# Patient Record
Sex: FEMALE | Race: WHITE | NOT HISPANIC OR LATINO | Employment: FULL TIME | URBAN - METROPOLITAN AREA
[De-identification: names, ages, dates, MRNs, and addresses within clinical notes are randomized per-mention and may not be internally consistent; named-entity substitution may affect disease eponyms.]

---

## 2022-05-23 ENCOUNTER — OFFICE VISIT (OUTPATIENT)
Dept: INTERNAL MEDICINE | Facility: CLINIC | Age: 27
End: 2022-05-23

## 2022-05-23 VITALS
TEMPERATURE: 96.9 F | OXYGEN SATURATION: 99 % | HEIGHT: 64 IN | SYSTOLIC BLOOD PRESSURE: 122 MMHG | DIASTOLIC BLOOD PRESSURE: 80 MMHG | WEIGHT: 124.4 LBS | BODY MASS INDEX: 21.24 KG/M2 | HEART RATE: 77 BPM

## 2022-05-23 DIAGNOSIS — Z13.220 SCREENING, LIPID: ICD-10-CM

## 2022-05-23 DIAGNOSIS — Z13.228 SCREENING FOR METABOLIC DISORDER: ICD-10-CM

## 2022-05-23 DIAGNOSIS — Z00.00 ANNUAL PHYSICAL EXAM: Primary | ICD-10-CM

## 2022-05-23 DIAGNOSIS — Z83.49 FAMILY HISTORY OF THYROID DISEASE: ICD-10-CM

## 2022-05-23 DIAGNOSIS — Z13.9 SCREENING FOR UNSPECIFIED CONDITION: ICD-10-CM

## 2022-05-23 DIAGNOSIS — Z13.0 SCREENING, ANEMIA, DEFICIENCY, IRON: ICD-10-CM

## 2022-05-23 PROCEDURE — 99385 PREV VISIT NEW AGE 18-39: CPT | Performed by: FAMILY MEDICINE

## 2022-05-23 RX ORDER — NORETHINDRONE ACETATE AND ETHINYL ESTRADIOL 1MG-20(21)
KIT ORAL
COMMUNITY

## 2022-05-24 LAB
ALBUMIN SERPL-MCNC: 4.6 G/DL (ref 3.9–5)
ALBUMIN/GLOB SERPL: 2.4 {RATIO} (ref 1.2–2.2)
ALP SERPL-CCNC: 58 IU/L (ref 44–121)
ALT SERPL-CCNC: 23 IU/L (ref 0–32)
AST SERPL-CCNC: 26 IU/L (ref 0–40)
BASOPHILS # BLD AUTO: 0.1 X10E3/UL (ref 0–0.2)
BASOPHILS NFR BLD AUTO: 1 %
BILIRUB SERPL-MCNC: 0.3 MG/DL (ref 0–1.2)
BUN SERPL-MCNC: 17 MG/DL (ref 6–20)
BUN/CREAT SERPL: 20 (ref 9–23)
CALCIUM SERPL-MCNC: 9.1 MG/DL (ref 8.7–10.2)
CHLORIDE SERPL-SCNC: 104 MMOL/L (ref 96–106)
CHOLEST SERPL-MCNC: 162 MG/DL (ref 100–199)
CO2 SERPL-SCNC: 21 MMOL/L (ref 20–29)
CREAT SERPL-MCNC: 0.86 MG/DL (ref 0.57–1)
EGFRCR SERPLBLD CKD-EPI 2021: 95 ML/MIN/1.73
EOSINOPHIL # BLD AUTO: 0.1 X10E3/UL (ref 0–0.4)
EOSINOPHIL NFR BLD AUTO: 1 %
ERYTHROCYTE [DISTWIDTH] IN BLOOD BY AUTOMATED COUNT: 11.7 % (ref 11.7–15.4)
FT4I SERPL CALC-MCNC: 1.9 (ref 1.2–4.9)
GLOBULIN SER CALC-MCNC: 1.9 G/DL (ref 1.5–4.5)
GLUCOSE SERPL-MCNC: 97 MG/DL (ref 65–99)
HCT VFR BLD AUTO: 41.8 % (ref 34–46.6)
HDLC SERPL-MCNC: 69 MG/DL
HGB BLD-MCNC: 14.1 G/DL (ref 11.1–15.9)
IMM GRANULOCYTES # BLD AUTO: 0 X10E3/UL (ref 0–0.1)
IMM GRANULOCYTES NFR BLD AUTO: 0 %
LDLC SERPL CALC-MCNC: 79 MG/DL (ref 0–99)
LYMPHOCYTES # BLD AUTO: 3.3 X10E3/UL (ref 0.7–3.1)
LYMPHOCYTES NFR BLD AUTO: 39 %
MCH RBC QN AUTO: 30.3 PG (ref 26.6–33)
MCHC RBC AUTO-ENTMCNC: 33.7 G/DL (ref 31.5–35.7)
MCV RBC AUTO: 90 FL (ref 79–97)
MONOCYTES # BLD AUTO: 0.5 X10E3/UL (ref 0.1–0.9)
MONOCYTES NFR BLD AUTO: 5 %
NEUTROPHILS # BLD AUTO: 4.5 X10E3/UL (ref 1.4–7)
NEUTROPHILS NFR BLD AUTO: 54 %
PLATELET # BLD AUTO: 258 X10E3/UL (ref 150–450)
POTASSIUM SERPL-SCNC: 4.4 MMOL/L (ref 3.5–5.2)
PROT SERPL-MCNC: 6.5 G/DL (ref 6–8.5)
RBC # BLD AUTO: 4.66 X10E6/UL (ref 3.77–5.28)
SODIUM SERPL-SCNC: 142 MMOL/L (ref 134–144)
T3RU NFR SERPL: 22 % (ref 24–39)
T4 SERPL-MCNC: 8.6 UG/DL (ref 4.5–12)
TRIGL SERPL-MCNC: 73 MG/DL (ref 0–149)
TSH SERPL DL<=0.005 MIU/L-ACNC: 2.41 UIU/ML (ref 0.45–4.5)
VLDLC SERPL CALC-MCNC: 14 MG/DL (ref 5–40)
WBC # BLD AUTO: 8.5 X10E3/UL (ref 3.4–10.8)

## 2022-05-25 ENCOUNTER — PATIENT ROUNDING (BHMG ONLY) (OUTPATIENT)
Dept: INTERNAL MEDICINE | Facility: CLINIC | Age: 27
End: 2022-05-25

## 2022-07-08 ENCOUNTER — OFFICE VISIT (OUTPATIENT)
Dept: INTERNAL MEDICINE | Facility: CLINIC | Age: 27
End: 2022-07-08

## 2022-07-08 ENCOUNTER — HOSPITAL ENCOUNTER (OUTPATIENT)
Dept: GENERAL RADIOLOGY | Facility: HOSPITAL | Age: 27
Discharge: HOME | End: 2022-07-08
Admitting: FAMILY MEDICINE

## 2022-07-08 VITALS
SYSTOLIC BLOOD PRESSURE: 110 MMHG | DIASTOLIC BLOOD PRESSURE: 60 MMHG | WEIGHT: 126 LBS | OXYGEN SATURATION: 99 % | HEART RATE: 71 BPM | HEIGHT: 64 IN | BODY MASS INDEX: 21.51 KG/M2 | TEMPERATURE: 96.8 F

## 2022-07-08 DIAGNOSIS — G89.29 CHRONIC BILATERAL LOW BACK PAIN WITHOUT SCIATICA: Primary | ICD-10-CM

## 2022-07-08 DIAGNOSIS — M54.50 CHRONIC BILATERAL LOW BACK PAIN WITHOUT SCIATICA: Primary | ICD-10-CM

## 2022-07-08 DIAGNOSIS — G89.29 CHRONIC BILATERAL LOW BACK PAIN WITHOUT SCIATICA: ICD-10-CM

## 2022-07-08 DIAGNOSIS — M54.50 CHRONIC BILATERAL LOW BACK PAIN WITHOUT SCIATICA: ICD-10-CM

## 2022-07-08 PROCEDURE — 72100 X-RAY EXAM L-S SPINE 2/3 VWS: CPT

## 2022-07-08 PROCEDURE — 99213 OFFICE O/P EST LOW 20 MIN: CPT | Performed by: FAMILY MEDICINE

## 2022-07-08 RX ORDER — CYCLOBENZAPRINE HCL 5 MG
5 TABLET ORAL 3 TIMES DAILY PRN
Qty: 30 TABLET | Refills: 1 | Status: SHIPPED | OUTPATIENT
Start: 2022-07-08

## 2023-05-01 ENCOUNTER — TELEPHONE (OUTPATIENT)
Dept: INTERNAL MEDICINE | Facility: CLINIC | Age: 28
End: 2023-05-01
Payer: COMMERCIAL

## 2023-05-25 ENCOUNTER — OFFICE VISIT (OUTPATIENT)
Dept: INTERNAL MEDICINE | Facility: CLINIC | Age: 28
End: 2023-05-25
Payer: COMMERCIAL

## 2023-05-25 VITALS
OXYGEN SATURATION: 97 % | WEIGHT: 124.6 LBS | TEMPERATURE: 97.3 F | HEIGHT: 64 IN | HEART RATE: 69 BPM | BODY MASS INDEX: 21.27 KG/M2 | SYSTOLIC BLOOD PRESSURE: 108 MMHG | DIASTOLIC BLOOD PRESSURE: 62 MMHG

## 2023-05-25 DIAGNOSIS — M54.50 CHRONIC BILATERAL LOW BACK PAIN WITHOUT SCIATICA: ICD-10-CM

## 2023-05-25 DIAGNOSIS — Z83.49 FAMILY HISTORY OF THYROID DISEASE: ICD-10-CM

## 2023-05-25 DIAGNOSIS — J30.1 SEASONAL ALLERGIC RHINITIS DUE TO POLLEN: ICD-10-CM

## 2023-05-25 DIAGNOSIS — G89.29 CHRONIC BILATERAL LOW BACK PAIN WITHOUT SCIATICA: ICD-10-CM

## 2023-05-25 DIAGNOSIS — Z00.00 ANNUAL PHYSICAL EXAM: Primary | ICD-10-CM

## 2023-05-25 PROBLEM — M41.9 SCOLIOSIS: Status: ACTIVE | Noted: 2023-05-25

## 2023-05-25 PROBLEM — J30.2 SEASONAL ALLERGIC RHINITIS: Status: ACTIVE | Noted: 2023-05-25

## 2023-05-25 PROCEDURE — 99395 PREV VISIT EST AGE 18-39: CPT | Performed by: FAMILY MEDICINE

## 2023-05-25 RX ORDER — CETIRIZINE HYDROCHLORIDE 10 MG/1
10 TABLET ORAL DAILY
COMMUNITY

## 2024-03-24 ENCOUNTER — PATIENT MESSAGE (OUTPATIENT)
Dept: INTERNAL MEDICINE | Facility: CLINIC | Age: 29
End: 2024-03-24
Payer: COMMERCIAL

## 2024-03-24 DIAGNOSIS — L70.0 ACNE VULGARIS: Primary | ICD-10-CM

## 2024-03-26 PROBLEM — L70.0 ACNE VULGARIS: Status: ACTIVE | Noted: 2024-03-26

## 2024-03-26 RX ORDER — CEPHALEXIN 500 MG/1
TABLET ORAL
Qty: 90 TABLET | Refills: 3 | Status: SHIPPED | OUTPATIENT
Start: 2024-03-26

## 2024-05-30 ENCOUNTER — OFFICE VISIT (OUTPATIENT)
Dept: INTERNAL MEDICINE | Facility: CLINIC | Age: 29
End: 2024-05-30
Payer: COMMERCIAL

## 2024-05-30 VITALS
TEMPERATURE: 98.4 F | HEIGHT: 64 IN | DIASTOLIC BLOOD PRESSURE: 68 MMHG | HEART RATE: 62 BPM | WEIGHT: 125.6 LBS | SYSTOLIC BLOOD PRESSURE: 106 MMHG | OXYGEN SATURATION: 99 % | BODY MASS INDEX: 21.44 KG/M2

## 2024-05-30 DIAGNOSIS — Z83.49 FAMILY HISTORY OF THYROID DISEASE: ICD-10-CM

## 2024-05-30 DIAGNOSIS — L70.0 ACNE VULGARIS: ICD-10-CM

## 2024-05-30 DIAGNOSIS — M41.9 SCOLIOSIS, UNSPECIFIED SCOLIOSIS TYPE, UNSPECIFIED SPINAL REGION: ICD-10-CM

## 2024-05-30 DIAGNOSIS — Z00.00 ANNUAL PHYSICAL EXAM: Primary | ICD-10-CM

## 2024-05-30 PROCEDURE — 99395 PREV VISIT EST AGE 18-39: CPT | Performed by: FAMILY MEDICINE

## 2024-08-20 DIAGNOSIS — L70.0 ACNE VULGARIS: ICD-10-CM

## 2024-08-20 RX ORDER — CEPHALEXIN 500 MG/1
TABLET ORAL
Qty: 90 TABLET | Refills: 3 | Status: SHIPPED | OUTPATIENT
Start: 2024-08-20

## 2024-10-01 ENCOUNTER — E-VISIT (OUTPATIENT)
Dept: FAMILY MEDICINE CLINIC | Facility: TELEHEALTH | Age: 29
End: 2024-10-01
Payer: COMMERCIAL

## 2024-10-01 PROCEDURE — FABRICHEALTHVISIT: Performed by: NURSE PRACTITIONER

## 2025-01-17 ENCOUNTER — TRANSCRIBE ORDERS (OUTPATIENT)
Dept: ADMINISTRATIVE | Facility: HOSPITAL | Age: 30
End: 2025-01-17
Payer: COMMERCIAL

## 2025-01-17 DIAGNOSIS — N97.9 FEMALE INFERTILITY: Primary | ICD-10-CM

## 2025-03-19 NOTE — PROGRESS NOTES
[facility]  Genetic Counseling Note    Patient Name:  Keyanna Hood  :   1995  ROSSANA:   2025  MRN:   [id number]    Partner's Name:  Ino Hood (goes by Bimal),  1995           Referring Provider: Hermilo Adler MD                                        Referring Diagnosis:  Keyanna Hood is a 30 y.o. female who was accompanied by her  Bimal. Keyanna is here in consultation for preconception counseling. 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    Patient medical history:       Keyanna and Bimal have been trying to conceive for over a year. They have done 1 cycle of [facility]UI at [facility] [facility] They reported various tests at their fertility clinic which identified no specific causes for infertility. Neither partner has had a karyotype.    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 They both had a pathogenic variant in the SZU43M6 gene: c.955C>T, p.Q319*. 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 This test was unable to determine whether the pathogenic variant and duplication are in cis or trans. The p.Q319* pathogenic variant and the OTC39M5 duplication are often found in the same copy (cis configuration) of the JML36A5 gene, and the cis allele has been previously reported to be associated with normal gene function (PMIDs: 09280731 and [id number]). If they are in trans, then the patient would be a carrier for this condition. It may be possible to pursue family member testing to determine phase of the variants.    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 IDUA gene, c.246C>G, p.H82Q. This does not mean her children are at risk for MPS I, rather they could have a false positive on enzyme based testing for MPS I.    Medications:   Current Outpatient Medications   Medication Sig Dispense Refill    Cephalexin 500 MG tablet Take 1/2 tablet twice daily as needed for acne 90 tablet 3    cetirizine (zyrTEC) 10 MG tablet Take 1 tablet by mouth Daily.      cyclobenzaprine (FLEXERIL) 5 MG tablet Take 1 tablet by mouth 3 (Three) Times a Day As Needed for Muscle Spasms. 30 tablet 1     No current facility-administered medications for this visit.     Psychosocial History:   Psychosocial assessment: Keyanna and Bimal wanted to learn what their CAH carrier screening results mean. They are interested in asking other family members if they would do testing to determine the phase of the RUH74B4 variants. They would like this testing arranged quickly so they don't miss more cycles than necessary.        Family History:    A three-generation family history was obtained for the patient and their partner. See attached pedigree. Of significance, fraternal twin sister with CRPS. Mother with Grave's disease. Paternal grandfather with diabetes.    Their partner, Bimal, is a 29 year old male. His maternal grandmother had Parkinson's diagnosed in her 50s.    We do not have medical records regarding any of these diagnoses.            Information Discussed:   1) CAH Carrier Screening Results  Keyanna and Bimal are both possible carriers for congenital adrenal hyperplasia (CAH). Carrier screening identified that they both have a pathogenic variant in the TBO29Z0 gene, as well as a HFK67M4 gene duplication. If the pathogenic variant and duplication are on different chromosomes, that individual would  be a carrier. If they are on the same chromosome, that individual would not be a carrier.     This test was not able to report which chromosome the duplication and variant were on. We discussed that family member testing may give us more information about this. For instance, if Keyanna's mom is not a carrier and is negative for the duplication, we would assume that Keyanna inherited both the variant and the duplication from her father and that they are on the same chromosome. Keyanna and Bimal plan to ask their parents if they are interested in doing family member screening for the PWJ34D3 gene. They confirmed we can discuss their test results with the family members. This could be arranged as a single gene test from [employer], [employer]. Saliva kits could be sent to their house. They could set this up through [facility] or [facility]. If they would like me to order it, they just need to reach out and let me know how to contact their family members. I will talk with them over the phone and can enter the order. Results typically take 2-3 weeks.     Keyanna was also wondering if she could have nonclassic CAH and not have had symptoms. The likelihood of this is very low given her carrier screening results which only picked up one pathogenic variant. Still, it does not eliminate the possibility of nonclassic CAH. If she wanted to get biochemical testing, she could discuss testing for 17-hydroxyprogesterone (17-OHP) with her care team. Note that normal ranges of 17-OHP vary by sex, pubertal status, phase of the menstrual cycle, and laboratory methodology.    CAH  CAH is an inherited disorder that affects the adrenal glands. The classical form of CAH affects 1 in 15,000 newborns. The nonclassical mild form may affect as many as 1 in 1,000 individuals. With treatment, children with CAH typically grow up healthy.     Around 75% of babies with classical CAH have salt-wasting at birth. This can cause life-threatening problems.  Treatment may include steroid replacement therapy and salt supplementation. In individuals with classical CAH, the body overproduces androgens. This can cause individuals with XX chromosomes to have virilization of the genitalia, which means they could have male-appearing external genitals. CAH does not affect the reproductive parts inside the body. Ambiguous genitalia may be detectable prenatally via ultrasound. Parents of babies with differences in genital development should be referred to providers knowledgeable about differences of sexual development (DSD).     Individuals with nonclassical CAH may develop symptoms around puberty, later in life, or not at all. Salt-wasting and being born with genital differences are not present with nonclassical CAH.    CAH is an autosomal recessive condition. It is caused by variants in the ENH40J4 or ZNI50E0 gene. Most cases of CAH (over 95%) are caused by variants in the AHB46S3 gene which lead to 21-hydroxylase deficiency (21-OHD). Genotype-phenotype correlation have been observed. Common variants in LDS54D5 can be classified as mild or severe. Individuals with one copy of a pathogenic variant in a CAH-associated gene are called carriers. If both parents are CAH carriers for the same gene, there is a 25% chance for each child to be affected with CAH.    If both parents are CAH carriers, it may be possible to pursue IVF and preimplantation genetic testing (PGT-M) to screen embryos for gene status. Parents could elect to only implant embryos that are found to carry one or zero of the identified variants. Consult with a fertility specialist is recommended if parents are interested in this approach. This can be a costly process and insurance coverage varies. Still, a family may elect IVF and PGT-M due to the benefits of reducing the risk of affected children. Families may also consider diagnostic genetic testing during pregnancy via CVS or amniocentesis.    CAH due to 21-OHD is  on the  screen (NBS). NBS uses a few drops of blood from a heel prick. NBS is performed in the first few days after birth and screens for many conditions. If a baby screens positive, they will have further testing. NBS does not take the place of diagnostic testing for CAH.      2) Chromosome analysis  Given the couple's history of infertility, we briefly discussed that chromosome analysis is an option. We did not discuss this in-depth initially, but Keyanna called back to discuss fruther.    Approximately 1 in 500 individuals carry a balanced translocation.  This is when a section from one chromosome of a particular pair changes places with a section from a chromosome of another pair. When the two breakpoints do not interrupt a gene and there is no gain or loss of material from either chromosome it is called a balanced translocation. Someone with a balanced translocation (a carrier) typically has no health or developmental problems associated with the translocation, although they may have difficulties with miscarriage or fertility, and may have a higher risk of a child born with an unbalanced chromosome complement. Approximately 3-5% of recurrent miscarriages are caused by a chromosome abnormality resulting from a translocation inherited from a parent.  Balanced translocations are detectable by chromosome analysis, also called karyotype. If Keyanna or Bimal plan to consider chromosome analysis. If they would like chromosome analysis in the future, they are welcome to contact us to set that up.      Follow-up Plan:    1) The patient plans to talk to family members about genetic testing for the UOP56M2 gene. If those family members consent to testing, [facility] single gene LVD16B0, PC91.1 is recommended.  2) If Keyanna or Bimal would like chromosome analysis in the future, they are welcome to contact us to set that up.    Please feel free to contact me with additional questions at [phone].    I  personally spent 60 minutes caring for the patient today. This time includes chart review, time spent during the visit, and time spent after the visit on documentation and follow-up. I provided genetic counseling services, including obtaining a structured family history, analysis of genetic and other risks, counseling of the patient regarding CAH carrier status, review of medical information, review of previous test results, and discussion of genetic testing options.    Cindy Jimenez MS, [facility] [place]etic Counselor  [facility]

## 2025-04-01 ENCOUNTER — CLINICAL SUPPORT (OUTPATIENT)
Dept: GENETICS | Facility: HOSPITAL | Age: 30
End: 2025-04-01
Payer: COMMERCIAL

## 2025-04-01 DIAGNOSIS — Z31.430 ENCOUNTER OF FEMALE FOR TESTING FOR GENETIC DISEASE CARRIER STATUS FOR PROCREATIVE MANAGEMENT: Primary | ICD-10-CM

## 2025-04-01 DIAGNOSIS — Z31.69 INFERTILITY COUNSELING: ICD-10-CM

## 2025-04-01 DIAGNOSIS — Z14.8 CARRIER OF GENETIC DISORDER: ICD-10-CM

## 2025-04-01 DIAGNOSIS — R89.8 ABNORMAL GENETIC TEST: ICD-10-CM

## 2025-04-01 PROCEDURE — 96041 GENETIC COUNSELING SVC EA 30: CPT

## 2025-05-01 ENCOUNTER — DOCUMENTATION (OUTPATIENT)
Dept: OBSTETRICS AND GYNECOLOGY | Facility: CLINIC | Age: 30
End: 2025-05-01
Payer: COMMERCIAL

## 2025-05-01 NOTE — PROGRESS NOTES
25    I spoke with Keyanna on the phone about updates on genetic testing in her family. I previously met with her and her  Ino Hood ( 1995) on 25 for preconception counseling. 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     Keyanna and Bimal have been trying to conceive for over a year. They have done 1 cycle of IUI at [facility] [place] ([facility] They reported various tests at their fertility clinic which identified no specific causes for infertility. Neither partner has had a karyotype.     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 They both had a pathogenic variant in the WMO34U2 gene: c.955C>T, p.Q319*. 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 This test was unable to determine whether the pathogenic variant and duplication are in cis or trans. The p.Q319* pathogenic variant and the IPO99D1 duplication are often found in the same copy (cis configuration) of the HBJ17S4 gene, and the cis allele has been previously reported to be associated with normal gene function (PMIDs: 78902418 and [id number]). If they are in trans, then the patient would be a carrier for this condition.    To get more information about variant phasing, their parents had testing for the EHQ14R4 gene. We have the initial results back:  - Bimal's dad Ino Hood,  1959 - negative for Q319* variant, duplication status pending  - Bimal's mom Lashay Hood,  1960 - positive for Q319* variant and duplication  - Keyanna's mom Mihaela Pringle,  1965 - negative for Q319* variant, duplication status pending    I have requested that Zaida share the duplication status for Ino and Mihaela. The results will likely be  available within the next week.    We don't have a final answer about variant phase, but at this point it looks most likely that Lashay has both the variant and the duplication, and that Bimal inherited these on the same chromosome. This would mean that he is not at risk to be a carrier for PFQ97W9 and there would be very, very low risk for Keyanna and Bimal's children to have CAH.     I plan to call Keyanna when we get the duplication results back for her mom and Bimal's dad.    Please feel free to contact me with additional questions at [phone].     Cindy Jimenez MS, [facility] [place]  Genetic Counselor  [facility]                   What Type Of Note Output Would You Prefer (Optional)?: Standard Output Hpi Title: Evaluation of Skin Lesions How Severe Are Your Spot(S)?: moderate Have Your Spot(S) Been Treated In The Past?: has not been treated

## 2025-05-12 ENCOUNTER — TELEPHONE (OUTPATIENT)
Dept: OBSTETRICS AND GYNECOLOGY | Facility: CLINIC | Age: 30
End: 2025-05-12
Payer: COMMERCIAL

## 2025-05-12 NOTE — TELEPHONE ENCOUNTER
5/12/2025    I called Keyanna Hood to discuss genetic testing results. I was unable to leave a voicemail.

## 2025-05-13 ENCOUNTER — DOCUMENTATION (OUTPATIENT)
Dept: GENETICS | Facility: HOSPITAL | Age: 30
End: 2025-05-13
Payer: COMMERCIAL

## 2025-05-13 NOTE — PROGRESS NOTES
2025    I spoke with Keyanna on the phone about updates on genetic testing in her family. I previously met with her and her  Ino Hood ( 1995) on 25 for preconception counseling. 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      Keyanna and Bimal have been trying to conceive for over a year. They have done 1 cycle of [facility]UI at [facility] ([facility] They reported various tests at their fertility clinic which identified no specific causes for infertility. Neither partner has had a karyotype.     The patient and her partner have had previous carrier screening. 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 They both had a pathogenic variant in the MAA40A4 gene: c.955C>T, p.Q319*. 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 This test was unable to determine whether the pathogenic variant and duplication are in cis or trans. The p.Q319* pathogenic variant and the SKY63N0 duplication are often found in the same copy (cis configuration) of the EAK65J6 gene, and the cis allele has been previously reported to be associated with normal gene function (PMIDs: 58637153 and [id number]). If they are in trans, then the patient would be a carrier for this condition.     To get more information about variant phasing, their parents had testing for the XST30E0 gene. We have the results back:  - Bimal's dad Ino Hood,  1959 - negative for Q319* variant and duplication - report amended on  to include negative results for the duplication  - Bimal's mom Lashay Hood,  1960 - positive for Q319* variant and duplication  - Keyanna's mom Mihaela Pringle,  1965 - negative for Q319* variant and duplication - report amended on  to  include negative results for the duplication    Based on these results, the variant and the duplication were inherited on the same chromosome in this family. Vicente are most likely not carriers for for NHL67H4 and there is very, very low risk for Keyanna and Bimal's children to have CAH. Carrier screening is not diagnostic and cannot rule out risk completely; this is called residual risk. Still, these results are good news and very reassuring.     Please feel free to contact me with additional questions at [phone].      Cindy Jimenez MS, [facility] [place]  Genetic Counselor  [facility]     Family results: